# Patient Record
Sex: MALE | Race: WHITE | NOT HISPANIC OR LATINO | Employment: OTHER | URBAN - NONMETROPOLITAN AREA
[De-identification: names, ages, dates, MRNs, and addresses within clinical notes are randomized per-mention and may not be internally consistent; named-entity substitution may affect disease eponyms.]

---

## 2022-10-21 ENCOUNTER — OFFICE VISIT (OUTPATIENT)
Dept: UROLOGY | Facility: CLINIC | Age: 62
End: 2022-10-21

## 2022-10-21 VITALS
WEIGHT: 204 LBS | OXYGEN SATURATION: 98 % | HEIGHT: 71 IN | SYSTOLIC BLOOD PRESSURE: 136 MMHG | BODY MASS INDEX: 28.56 KG/M2 | DIASTOLIC BLOOD PRESSURE: 82 MMHG | HEART RATE: 73 BPM

## 2022-10-21 DIAGNOSIS — N40.0 BENIGN PROSTATIC HYPERPLASIA, UNSPECIFIED WHETHER LOWER URINARY TRACT SYMPTOMS PRESENT: Primary | ICD-10-CM

## 2022-10-21 DIAGNOSIS — R35.0 BENIGN PROSTATIC HYPERPLASIA WITH URINARY FREQUENCY: ICD-10-CM

## 2022-10-21 DIAGNOSIS — N40.1 BENIGN PROSTATIC HYPERPLASIA WITH URINARY FREQUENCY: ICD-10-CM

## 2022-10-21 LAB
BILIRUB BLD-MCNC: NEGATIVE MG/DL
CLARITY, POC: CLEAR
COLOR UR: YELLOW
EXPIRATION DATE: ABNORMAL
GLUCOSE UR STRIP-MCNC: NEGATIVE MG/DL
KETONES UR QL: ABNORMAL
LEUKOCYTE EST, POC: ABNORMAL
Lab: ABNORMAL
NITRITE UR-MCNC: NEGATIVE MG/ML
PH UR: 5.5 [PH] (ref 5–8)
PROT UR STRIP-MCNC: ABNORMAL MG/DL
RBC # UR STRIP: NEGATIVE /UL
SP GR UR: 1.01 (ref 1–1.03)
UROBILINOGEN UR QL: NORMAL

## 2022-10-21 PROCEDURE — 81003 URINALYSIS AUTO W/O SCOPE: CPT | Performed by: NURSE PRACTITIONER

## 2022-10-21 PROCEDURE — 99204 OFFICE O/P NEW MOD 45 MIN: CPT | Performed by: NURSE PRACTITIONER

## 2022-10-21 PROCEDURE — 51798 US URINE CAPACITY MEASURE: CPT | Performed by: NURSE PRACTITIONER

## 2022-10-21 RX ORDER — VIBEGRON 75 MG/1
75 TABLET, FILM COATED ORAL DAILY
Qty: 30 TABLET | Refills: 11 | Status: SHIPPED | OUTPATIENT
Start: 2022-10-21

## 2022-10-21 RX ORDER — ALLOPURINOL 300 MG/1
TABLET ORAL
COMMUNITY
Start: 2022-09-26

## 2022-10-21 RX ORDER — COLCHICINE 0.6 MG/1
CAPSULE ORAL
COMMUNITY
Start: 2022-09-26

## 2022-10-21 NOTE — PROGRESS NOTES
Office Visit Males LUTS      Patient Name: Aman Duran  : 1960   MRN: [id number]     Chief Complaint:   Chief Complaint   Patient presents with   • Benign Prostatic Hypertrophy   • Consult       Referring Provider: Kaleb Christianson*    History of Present Illness: Aman Duran is a 62 y.o. male who presents today with history of  who presents with BPH.  Primary symptom includes: Frequency and nocturia  Patient denies hematuria and incontinence  Onset was worsening since his 30's    Previous treatments include: doxazosin, and flomax    IPSS Questionnaire (AUA-7):  Incomplete emptying  Over the past month, how often have you had a sensation of not emptying your bladder completely after you finish?: Less than half the time (10/21/22 1517)  Frequency  Over the past month, how often have you had to urinate again less than two hours after you finishing urinating ?: Almost always (10/21/22 1517)  Intermittency  Over the past month, how often have you found you stopped and started again several time when you urinated ?: About half the time (10/21/22 1517)  Urgency  Over the last month, how difficult  have you found it to postpone urination ?: Less than half the time (10/21/22 1517)  Weak Stream  Over the past month, how often have you had a weak urinary stream ?: About half the time (10/21/22 1517)  Straining  Over the past month, how often have you had to push or strain to begin urination ?: Less than half the time (10/21/22 1517)  Nocturia  Over the past month, how many times did you most typically get up to urinate from the time you went to bed until the time you got up in the morning ?: Almost always (10/21/22 1517)  Quality of life due to urinary symptoms  If you were to spend the rest of your life with your urinary condition the way it is now, how would feel about that?: Terrible (10/21/22 1517)    Scores  Total IPSS Score: 22 (10/21/22 1517)  Total Score = Symtomatic Level: severely  "symptomatic: 20-35 (10/21/22 1517)       Subjective      Review of System:   Constitutional: No fevers or chills  Genitourinary:   Musculoskeletal: No flank pain    Past Medical History: History reviewed. No pertinent past medical history.    Past Surgical History:   Past Surgical History:   Procedure Laterality Date   • TONSILLECTOMY         Family History:   Family History   Problem Relation Age of Onset   • Cancer Mother    • Prostate cancer Paternal Grandfather    • Heart disease Maternal Grandmother        Social History:   Social History     Socioeconomic History   • Marital status:    Tobacco Use   • Smoking status: Never   Vaping Use   • Vaping Use: Never used   Substance and Sexual Activity   • Alcohol use: Yes     Alcohol/week: 6.0 - 7.0 standard drinks     Types: 6 - 7 Cans of beer per week   • Drug use: Never   • Sexual activity: Never       Medications:     Current Outpatient Medications:   •  allopurinol (ZYLOPRIM) 300 MG tablet, , Disp: , Rfl:   •  colchicine 0.6 MG capsule capsule, , Disp: , Rfl:   •  Doxazosin Mesylate (CARDURA PO), doxazosin, Disp: , Rfl:   •  Vibegron (Gemtesa) 75 MG tablet, Take 1 tablet by mouth Daily., Disp: 30 tablet, Rfl: 11    Allergies:   No Known Allergies    Objective     Physical Exam:   Vital Signs:   Vitals:    10/21/22 1506   BP: 136/82   Pulse: 73   SpO2: 98%   Weight: 92.5 kg (204 lb)   Height: 180.3 cm (71\")     Body mass index is 28.45 kg/m².     Constitutional: NAD, WDWN.   Neurological: A + O x 3.    Skin: Pink, warm and dry.  No rashes noted.  Psych: Normal mood and affect     Labs  No results found for: PSA    Brief Urine Lab Results  (Last result in the past 365 days)      Color   Clarity   Blood   Leuk Est   Nitrite   Protein   CREAT   Urine HCG        10/21/22 1516 Yellow   Clear   Negative   Small (1+)   Negative   Trace                 PVR  Post-void residual performed by staff - 0ml    Assessment / Plan      Assessment  Mr. Duran is a 62 y.o. " male who presents with LUTS, primarily frequency and nocturia.  Patient currently takes doxazosin tried Flomax and failed this treatment made him feel like he was having retrograde urination.  We discussed BPH with outlet obstruction versus overactive bladder.  Patient would need BPH work-up to make the determination between the 2, at this time he is more interested in trying to improve his symptoms with medication opposed to procedures.  We discussed trialing an OAB medication to see if this would improve frequency and nocturia as this is most bothersome to him.  Patient does have HTN I feel the best choice for him would be Gemtesa as it does not have increased risk of BP elevation.  Patient is in agreement for 1 month trial and if no improvement in symptoms will consider at that time scheduling further BPH work-up.    Plan  1.  Start daily Gemtesa x1 month.  Samples given and prescription sent  2.  IPSS and PVR 1 month      Follow Up:   Return in about 4 weeks (around 11/18/2022) for Follow up Mandi.    ANGEL Curtis  [facility]